# Patient Record
(demographics unavailable — no encounter records)

---

## 2025-01-28 NOTE — HISTORY OF PRESENT ILLNESS
[de-identified] : Rash on body and face [FreeTextEntry6] :  14 month old girl presents with a rash x 3 days. Rash started on her face and then appeared on her arms, abdomen, and thighs. Rash on her cheeks has resolved. Associated rhinorrhea. Cousin had 5ths disease a month ago. No fevers, cough, change in behavior/sleep/feeding. Denies new exposure to food, soaps, lotions, detergents.   New patient to this practice. Born at 34 weeks. 23 day NICU stay requiring supplemental O2 and feeding support. Was on reflux medication until ~ 6 months of age. Also had MPA as an infant but tolerated whole milk at 1 year. Mother with hx of T1DM. Not taking any medications. NKA/NKDA.

## 2025-01-28 NOTE — DISCUSSION/SUMMARY
[FreeTextEntry1] :  14 month old girl presents with a rash.  Rash on body is consistent with a viral exanthem, reassurance provided, no treatment necessary. URI - Use nasal saline and suction PRN throughout the day.   Follow up PRN, for worsening symptoms, if fevers develop, or failure to improve.

## 2025-01-28 NOTE — PHYSICAL EXAM
[Playful] : playful [Clear Rhinorrhea] : clear rhinorrhea [NL] : moves all extremities x4, warm, well perfused x4 [de-identified] : Small hemangioma on left abdomen. Erythematous, macular, blanchable rash on b/l upper arms and outer thighs. Faint macular rash on lower abdomen. Face is clear.

## 2025-02-18 NOTE — HISTORY OF PRESENT ILLNESS
[de-identified] : Fever [FreeTextEntry6] : fever and low energy x 1 day  no cough or rhinorrhea  was with cousins 3 days ago who have a viral illness

## 2025-03-07 NOTE — DEVELOPMENTAL MILESTONES
[Normal Development] : Normal Development [Imitates scribbling] : imitates scribbling [Points to ask for something] : points to ask for something or to get help [Uses 3 words other than names] : uses 3 words other than names [Speaks in sounds that seem like] : speaks in sounds that seem like an unknown language [Squats to  objects] : squats to  objects [Crawls up a few steps] : crawls up a few steps [Begins to run] : begins to run [Makes pat with crayon] : makes pat with loreleiyon [Drops object into and takes object] : drops object into and takes object out of container

## 2025-03-07 NOTE — HISTORY OF PRESENT ILLNESS
[Mother] : mother [Cow's milk (Ounces per day ___)] : consumes [unfilled] oz of cow's milk per day [Normal] : Normal [Sippy cup use] : Sippy cup use [Brushing teeth] : Brushing teeth [Toothpaste] : Primary Fluoride Source: Toothpaste [Playtime] : Playtime [No] : Not at  exposure [Car seat in back seat] : Car seat in back seat [Carbon Monoxide Detectors] : Carbon monoxide detectors [Exposure to electronic nicotine delivery system] : No exposure to electronic nicotine delivery system [Up to date] : Up to date [FreeTextEntry7] : 15 month well visit First well visit at this office [de-identified] : None [de-identified] : varied diet

## 2025-03-07 NOTE — DISCUSSION/SUMMARY
[Communication and Social Development] : communication and social development [Sleep Routines and Issues] : sleep routines and issues [Temper Tantrums and Discipline] : temper tantrums and discipline [Healthy Teeth] : healthy teeth [Safety] : safety [] : The components of the vaccine(s) to be administered today are listed in the plan of care. The disease(s) for which the vaccine(s) are intended to prevent and the risks have been discussed with the caretaker.  The risks are also included in the appropriate vaccination information statements which have been provided to the patient's caregiver.  The caregiver has given consent to vaccinate. [FreeTextEntry1] :  - Growing appropriately - Meeting developmental milestones - Prior hgb and lead levels normal  - Vaccines: pentacel and prevnar  - Lead screening negative - Well visit in 3 months

## 2025-03-07 NOTE — PHYSICAL EXAM
[Alert] : alert [No Acute Distress] : no acute distress [Normocephalic] : normocephalic [Anterior Elton Closed] : anterior fontanelle closed [Red Reflex Bilateral] : red reflex bilateral [PERRL] : PERRL [Normally Placed Ears] : normally placed ears [Auricles Well Formed] : auricles well formed [Clear Tympanic membranes with present light reflex and bony landmarks] : clear tympanic membranes with present light reflex and bony landmarks [No Discharge] : no discharge [Nares Patent] : nares patent [Palate Intact] : palate intact [Uvula Midline] : uvula midline [Tooth Eruption] : tooth eruption  [Supple, full passive range of motion] : supple, full passive range of motion [No Palpable Masses] : no palpable masses [Symmetric Chest Rise] : symmetric chest rise [Clear to Auscultation Bilaterally] : clear to auscultation bilaterally [Regular Rate and Rhythm] : regular rate and rhythm [S1, S2 present] : S1, S2 present [No Murmurs] : no murmurs [+2 Femoral Pulses] : +2 femoral pulses [Soft] : soft [NonTender] : non tender [Non Distended] : non distended [Normoactive Bowel Sounds] : normoactive bowel sounds [No Hepatomegaly] : no hepatomegaly [No Splenomegaly] : no splenomegaly [Sahil 1] : Sahil 1 [No Clitoromegaly] : no clitoromegaly [Normal Vaginal Introitus] : normal vaginal introitus [Patent] : patent [Normally Placed] : normally placed [No Abnormal Lymph Nodes Palpated] : no abnormal lymph nodes palpated [No Clavicular Crepitus] : no clavicular crepitus [Negative Gomez-Ortalani] : negative Gomez-Ortalani [Symmetric Buttocks Creases] : symmetric buttocks creases [No Spinal Dimple] : no spinal dimple [NoTuft of Hair] : no tuft of hair [Cranial Nerves Grossly Intact] : cranial nerves grossly intact [No Rash or Lesions] : no rash or lesions

## 2025-05-02 NOTE — HISTORY OF PRESENT ILLNESS
[de-identified] : congestion and cough [FreeTextEntry6] : cough and congestion x 2 weeks afebrile  +rhinorrhea and watery eyes

## 2025-05-02 NOTE — HISTORY OF PRESENT ILLNESS
[de-identified] : congestion and cough [FreeTextEntry6] : cough and congestion x 2 weeks afebrile  +rhinorrhea and watery eyes

## 2025-05-02 NOTE — DISCUSSION/SUMMARY
[FreeTextEntry1] : RVP pending Overall well appearing child with clear lungs, no signs of acute bacterial infection Likely viral URI Supportive care encouraged Return for worsening symptoms or new concerns